# Patient Record
Sex: FEMALE | Race: WHITE | NOT HISPANIC OR LATINO | Employment: FULL TIME | URBAN - METROPOLITAN AREA
[De-identification: names, ages, dates, MRNs, and addresses within clinical notes are randomized per-mention and may not be internally consistent; named-entity substitution may affect disease eponyms.]

---

## 2023-06-03 ENCOUNTER — HOSPITAL ENCOUNTER (EMERGENCY)
Facility: HOSPITAL | Age: 28
Discharge: HOME | End: 2023-06-03
Attending: EMERGENCY MEDICINE
Payer: MEDICAID

## 2023-06-03 VITALS
SYSTOLIC BLOOD PRESSURE: 133 MMHG | OXYGEN SATURATION: 94 % | BODY MASS INDEX: 19.31 KG/M2 | WEIGHT: 120.15 LBS | HEIGHT: 66 IN | DIASTOLIC BLOOD PRESSURE: 58 MMHG | RESPIRATION RATE: 16 BRPM | TEMPERATURE: 99.2 F | HEART RATE: 87 BPM

## 2023-06-03 DIAGNOSIS — L03.211 CELLULITIS, FACE: Primary | ICD-10-CM

## 2023-06-03 PROCEDURE — 25010000002 CEFTRIAXONE PER 250 MG: Performed by: NURSE PRACTITIONER

## 2023-06-03 PROCEDURE — 96372 THER/PROPH/DIAG INJ SC/IM: CPT

## 2023-06-03 PROCEDURE — 99282 EMERGENCY DEPT VISIT SF MDM: CPT

## 2023-06-03 RX ORDER — CEPHALEXIN 500 MG/1
500 CAPSULE ORAL 3 TIMES DAILY
Qty: 30 CAPSULE | Refills: 0 | Status: SHIPPED | OUTPATIENT
Start: 2023-06-03

## 2023-06-03 RX ADMIN — LIDOCAINE HYDROCHLORIDE 1 G: 10 INJECTION, SOLUTION EPIDURAL; INFILTRATION; INTRACAUDAL; PERINEURAL at 14:37

## 2023-06-03 NOTE — DISCHARGE INSTRUCTIONS
Take medication as prescribed.  Keep the area clean.  Avoid picking at the area.  Follow-up with dermatology.  Return for new or worsening symptoms.

## 2023-06-03 NOTE — ED PROVIDER NOTES
Subjective   History of Present Illness  Patient is a 27-year-old white female with no significant medical history who presents today with complaints of draining sores on her face.  She states sore on her forehead started yesterday.  She states she has been squeezing and digging at it and has been getting some chunky white and gray-colored material from it.  Complains of some tenderness.  No fever or chills.  She states she has a couple other small areas on her face that are starting in a similar manner.    Review of Systems   Constitutional:  Negative for chills and fever.   Gastrointestinal:  Negative for nausea and vomiting.   Skin:  Positive for wound.        Draining facial wounds     No past medical history on file.    No Known Allergies    No past surgical history on file.    No family history on file.    Social History     Socioeconomic History    Marital status: Single           Objective   Physical Exam  Vital signs and triage nurse note reviewed.  Constitutional: Awake, alert; well-developed and well-nourished. No acute distress is noted.  HEENT: Normocephalic, atraumatic; pupils are PERRL with intact EOM; oropharynx is pink and moist without exudate or erythema.  No drooling or pooling of oral secretions.  Neck: Supple  Cardiovascular: Regular rate and rhythm, normal S1-S2.  No murmur noted.  Pulmonary: Respiratory effort regular nonlabored, breath sounds clear to auscultation all fields.  Musculoskeletal: Independent range of motion of all extremities with no palpable tenderness or edema.  Neuro: Alert oriented x3, speech is clear and appropriate, GCS 15.    Skin: Flesh tone, warm, dry.  There are several small scabbed lesions noted over the face without evidence of cellulitic change.  There is a larger scabbed lesion noted over the right upper forehead with some mild surrounding erythema.  There is also some yellow-colored crusting noted to the lower end of the area.  There is no induration or  fluctuance.  No active drainage appreciated at this time.    Procedures           ED Course      Labs Reviewed - No data to display  No radiology results for the last day  Medications   cefTRIAXone (ROCEPHIN) 350 mg/ml in lidocaine 1% IM syringe (1 gm vial) (has no administration in time range)                                          Medical Decision Making  Patient presents today with complaints of a draining wound to the right forehead with several other small scabbed lesions over the face.  No fever.  Differentials include but not limited to cellulitis, staph infection, abscess    Patient had above exam and evaluation.  She was given IM Rocephin for mild cellulitis of the face.  She is afebrile and hemodynamically stable.    Findings were discussed with patient.  She will be discharged home with prescriptions for Keflex and Bactroban.  She is instructed to follow-up with dermatology but was given warning signs for prompt return and voiced understanding.    Problems Addressed:  Cellulitis, face: complicated acute illness or injury    Risk  Prescription drug management.        Final diagnoses:   Cellulitis, face       ED Disposition  ED Disposition       ED Disposition   Discharge    Condition   Stable    Comment   --               Jerome Garcia Jr., MD  [address] [facility] [place] [id number] [phone]          Banet, Duane Edward, MD  [address]  [address]  [place] [address]  [phone]          Tiffany Mccauley MD  [address] [facility]  [address] [place] [address] [place] [address]  [phone]               Medication List        New Prescriptions      cephalexin 500 MG capsule  Commonly known as: KEFLEX  Take 1 capsule by mouth 3 (Three) Times a Day.     mupirocin 2 % ointment  Commonly known as: BACTROBAN  Apply 1 application topically to the appropriate area as directed 3 (Three) Times a Day.               Where to Get Your Medications        These medications were sent to [facility]/pharmacy #[id number] - [place],  [place] - [address] - [phone]  - [phone] FX  [address] [place] [address]      Phone: [phone]   cephalexin 500 MG capsule  mupirocin 2 % ointment            Emily Booth, CHETNA  06/03/23 6291

## 2025-06-18 ENCOUNTER — OFFICE (OUTPATIENT)
Dept: URBAN - METROPOLITAN AREA CLINIC 64 | Facility: CLINIC | Age: 30
End: 2025-06-18
Payer: SELF-PAY

## 2025-06-18 VITALS
HEIGHT: 66 IN | WEIGHT: 120 LBS | HEART RATE: 85 BPM | DIASTOLIC BLOOD PRESSURE: 79 MMHG | SYSTOLIC BLOOD PRESSURE: 135 MMHG

## 2025-06-18 DIAGNOSIS — R14.0 ABDOMINAL DISTENSION (GASEOUS): ICD-10-CM

## 2025-06-18 DIAGNOSIS — R10.9 UNSPECIFIED ABDOMINAL PAIN: ICD-10-CM

## 2025-06-18 DIAGNOSIS — R19.4 CHANGE IN BOWEL HABIT: ICD-10-CM

## 2025-06-18 PROCEDURE — 99203 OFFICE O/P NEW LOW 30 MIN: CPT | Performed by: NURSE PRACTITIONER

## 2025-06-18 RX ORDER — DICYCLOMINE HYDROCHLORIDE 10 MG/1
40 CAPSULE ORAL
Qty: 60 | Refills: 11 | Status: ACTIVE
Start: 2025-06-18